# Patient Record
(demographics unavailable — no encounter records)

---

## 2025-04-01 NOTE — ASSESSMENT
[TextEntry] : The patient's lichen sclerosus is well-controlled with twice weekly clobetasol.  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  She did not fill her prescription for estradiol following her last visit because it was too expensive. 23 minutes of total time was spent on the date of the encounter. This included time for review of medical records and laboratory results, face to face time (including the physical examination counseling and coordination of care), time for patient education, treatment plans, answering questions, communicating with other doctors and for medical record documentation.  The time spent is separate from time spent on separately billed procedures.

## 2025-04-01 NOTE — PLAN
[FreeTextEntry1] : I recommended that she continue to use clobetasol twice weekly. I gave her a therapeutic trial of vulvar estradiol cream to use (a pea-sized amount) at the introitus nightly for 3 weeks. After that, she can taper the frequency of estradiol use to a level that we will maintain symptomatic improvement. If she finds no difference in her urinary symptoms, she can discontinue the estradiol at any time.  I showed her the prices on [facility] which were well within what she could afford.  She should return in 6 months for a repeat evaluation.

## 2025-04-01 NOTE — HISTORY OF PRESENT ILLNESS
[FreeTextEntry1] : The patient is using clobetasol twice weekly.  The patient continues to be monitored for her meningiomas.  A new 1 appeared on the left side.  She continues to complain of urinary frequency and nocturia.  At her last visit, a diagnosis of Vestibulodynia was made and she was given estradiol cream but never filled the prescription because it was too expensive.

## 2025-04-01 NOTE — PHYSICAL EXAM
[Chaperone Present] : A chaperone was present in the examining room during all aspects of the physical examination [TextEntry] : ***General*** General Appearance: normal; Judgment and insight: normal; the patient is oriented to time, place and person; Recent and remote memory: normal; Mood and affect: normal; Respiratory effort: normal.  ***Pelvic Examination*** External genitalia: The appearance and hair distribution are normal; no lesions are appreciated. Urethra/urethral meatus: No masses or tenderness are appreciated; there is no scarring noted. Bladder: nontender; there is no fullness appreciated; no masses were palpated. Vagina: the vagina is slightly atrophic; no abnormal discharge is seen; no lesions are seen; pelvic support is adequate without any evidence of cystocele or rectocele. Cervix: normal in appearance; no overt lesions are seen. Uterus: retroverted; normal in size, mobile, nontender, and well supported. Adnexa/parametria: nontender and not enlarged; no masses are palpated. A stool specimen was not indicated at this time.  ***colposcopy of the vulva*** Colposcopy of the external genitalia showed that the labia majora and labia minora were normal. There was a small amount of mild white epithelium seen over the tip of the clitoral chew. There was no white epithelium anywhere else on the vulva. There was 8/10 vestibular tenderness of the anterior minor vestibular glands, and 8/10 vestibular tenderness of the posterior minor vestibular glands. There was no evidence of other dermatopathology. There was no erythema of the introitus. There was no evidence of steroid dermatitis. There was moderate atrophy at the introitus. The introitus barely admits two examining fingers.